# Patient Record
Sex: MALE | Race: ASIAN | NOT HISPANIC OR LATINO | Employment: STUDENT | URBAN - METROPOLITAN AREA
[De-identification: names, ages, dates, MRNs, and addresses within clinical notes are randomized per-mention and may not be internally consistent; named-entity substitution may affect disease eponyms.]

---

## 2019-01-30 ENCOUNTER — OFFICE VISIT (OUTPATIENT)
Dept: MEDICAL GROUP | Facility: PHYSICIAN GROUP | Age: 24
End: 2019-01-30
Payer: COMMERCIAL

## 2019-01-30 ENCOUNTER — HOSPITAL ENCOUNTER (OUTPATIENT)
Dept: LAB | Facility: MEDICAL CENTER | Age: 24
End: 2019-01-30
Attending: FAMILY MEDICINE
Payer: COMMERCIAL

## 2019-01-30 VITALS
HEART RATE: 80 BPM | HEIGHT: 63 IN | RESPIRATION RATE: 12 BRPM | OXYGEN SATURATION: 100 % | BODY MASS INDEX: 20.55 KG/M2 | WEIGHT: 116 LBS | DIASTOLIC BLOOD PRESSURE: 68 MMHG | TEMPERATURE: 98.4 F | SYSTOLIC BLOOD PRESSURE: 104 MMHG

## 2019-01-30 DIAGNOSIS — Z76.89 ESTABLISHING CARE WITH NEW DOCTOR, ENCOUNTER FOR: ICD-10-CM

## 2019-01-30 DIAGNOSIS — Z82.3 FAMILY HISTORY OF STROKE: ICD-10-CM

## 2019-01-30 DIAGNOSIS — Z82.49 FAMILY HISTORY OF HYPERTENSION IN MOTHER: ICD-10-CM

## 2019-01-30 LAB
ALBUMIN SERPL BCP-MCNC: 4.5 G/DL (ref 3.2–4.9)
ALBUMIN/GLOB SERPL: 1.8 G/DL
ALP SERPL-CCNC: 109 U/L (ref 30–99)
ALT SERPL-CCNC: 23 U/L (ref 2–50)
ANION GAP SERPL CALC-SCNC: 5 MMOL/L (ref 0–11.9)
AST SERPL-CCNC: 16 U/L (ref 12–45)
BASOPHILS # BLD AUTO: 1.1 % (ref 0–1.8)
BASOPHILS # BLD: 0.06 K/UL (ref 0–0.12)
BILIRUB SERPL-MCNC: 1.2 MG/DL (ref 0.1–1.5)
BUN SERPL-MCNC: 13 MG/DL (ref 8–22)
CALCIUM SERPL-MCNC: 10.3 MG/DL (ref 8.5–10.5)
CHLORIDE SERPL-SCNC: 105 MMOL/L (ref 96–112)
CHOLEST SERPL-MCNC: 141 MG/DL (ref 100–199)
CO2 SERPL-SCNC: 30 MMOL/L (ref 20–33)
CREAT SERPL-MCNC: 0.79 MG/DL (ref 0.5–1.4)
EOSINOPHIL # BLD AUTO: 0.1 K/UL (ref 0–0.51)
EOSINOPHIL NFR BLD: 1.9 % (ref 0–6.9)
ERYTHROCYTE [DISTWIDTH] IN BLOOD BY AUTOMATED COUNT: 37.6 FL (ref 35.9–50)
FASTING STATUS PATIENT QL REPORTED: NORMAL
GLOBULIN SER CALC-MCNC: 2.5 G/DL (ref 1.9–3.5)
GLUCOSE SERPL-MCNC: 84 MG/DL (ref 65–99)
HCT VFR BLD AUTO: 46.8 % (ref 42–52)
HDLC SERPL-MCNC: 45 MG/DL
HGB BLD-MCNC: 16.1 G/DL (ref 14–18)
IMM GRANULOCYTES # BLD AUTO: 0.01 K/UL (ref 0–0.11)
IMM GRANULOCYTES NFR BLD AUTO: 0.2 % (ref 0–0.9)
LDLC SERPL CALC-MCNC: 85 MG/DL
LYMPHOCYTES # BLD AUTO: 2 K/UL (ref 1–4.8)
LYMPHOCYTES NFR BLD: 37.9 % (ref 22–41)
MCH RBC QN AUTO: 29 PG (ref 27–33)
MCHC RBC AUTO-ENTMCNC: 34.4 G/DL (ref 33.7–35.3)
MCV RBC AUTO: 84.3 FL (ref 81.4–97.8)
MONOCYTES # BLD AUTO: 0.39 K/UL (ref 0–0.85)
MONOCYTES NFR BLD AUTO: 7.4 % (ref 0–13.4)
NEUTROPHILS # BLD AUTO: 2.72 K/UL (ref 1.82–7.42)
NEUTROPHILS NFR BLD: 51.5 % (ref 44–72)
NRBC # BLD AUTO: 0 K/UL
NRBC BLD-RTO: 0 /100 WBC
PLATELET # BLD AUTO: 248 K/UL (ref 164–446)
PMV BLD AUTO: 12 FL (ref 9–12.9)
POTASSIUM SERPL-SCNC: 4 MMOL/L (ref 3.6–5.5)
PROT SERPL-MCNC: 7 G/DL (ref 6–8.2)
RBC # BLD AUTO: 5.55 M/UL (ref 4.7–6.1)
SODIUM SERPL-SCNC: 140 MMOL/L (ref 135–145)
TRIGL SERPL-MCNC: 53 MG/DL (ref 0–149)
WBC # BLD AUTO: 5.3 K/UL (ref 4.8–10.8)

## 2019-01-30 PROCEDURE — 36415 COLL VENOUS BLD VENIPUNCTURE: CPT

## 2019-01-30 PROCEDURE — 80061 LIPID PANEL: CPT

## 2019-01-30 PROCEDURE — 85025 COMPLETE CBC W/AUTO DIFF WBC: CPT

## 2019-01-30 PROCEDURE — 80053 COMPREHEN METABOLIC PANEL: CPT

## 2019-01-30 PROCEDURE — 99203 OFFICE O/P NEW LOW 30 MIN: CPT | Performed by: FAMILY MEDICINE

## 2019-01-30 ASSESSMENT — PATIENT HEALTH QUESTIONNAIRE - PHQ9: CLINICAL INTERPRETATION OF PHQ2 SCORE: 0

## 2019-01-30 NOTE — PROGRESS NOTES
CC:  Diagnoses of Establishing care with new doctor, encounter for, Family history of stroke, and Family history of hypertension in mother were pertinent to this visit.    HISTORY OF THE PRESENT ILLNESS: Patient is a 23 y.o. male. This pleasant patient is here today to establish care.  It has been several years since he has been followed by primary care provider.  Recently moved here from [facility] in 2016.  Denies any surgeries or hospitalizations.    Health Maintenance: Received his flu vaccine in October 2018.  States he is up-to-date on his other immunizations.  We will ask him to bring records in at next visit or my chart us the immunization records.      Family history of stroke  This is a chronic medical problem.  He mentions that his 57-year-old mother suffered a stroke in May 2018.  Since then she has been in and out of the hospitals.  She is currently at [facility].  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    Family history of hypertension in mother  This is a chronic diagnosis for his mother.  She is currently on treatment for hypertension.  She suffered a stroke in May 2018 and is currently hospitalized at [facility].  His blood pressure is well controlled.  He denies any headaches or chest pain.    Allergies: Patient has no known allergies.    No current [facility]-ordered outpatient prescriptions on file.     No current [facility]-ordered facility-administered medications on file.        History reviewed. No pertinent past medical history.    History reviewed. No pertinent surgical history.    Social History   Substance Use Topics   • Smoking status: Never Smoker   • Smokeless tobacco: Never Used   • Alcohol use No       Social History     Social History Narrative   • No narrative on file       Family History   Problem Relation Age of Onset   • Hypertension Mother    • Stroke Mother    • No Known Problems Father    • No Known Problems Brother        ROS:     -  "Constitutional: Negative for fever, chills, and fatigue   - Eyes:  Negative blurry vision or eye pain    - ENT: Negative for ear pain, rhinorrhea, sinus congestion, sore throat    - Respiratory: Negative for cough or shortness of breath    - Cardiovascular: Negative for chest pain, palpitations    - Gastrointestinal: Negative for heartburn, nausea, vomiting, abdominal pain,     - Genitourinary: Negative for dysuria    - Musculoskeletal: Negative for myalgias    - Skin: Negative for rash, itching    - Neurological: Negative for headaches, dizziness    - Endo:  Negative for increased thirst or polyuria    - Heme/Lymph: Does not bruise/bleed easily.     - Psychiatric/Behavioral: Negative for depression and anxiety   .  Exam: Blood pressure 104/68, pulse 80, temperature 36.9 °C (98.4 °F), resp. rate 12, height 1.6 m (5' 3\"), weight 52.6 kg (116 lb), SpO2 100 %. Body mass index is 20.55 kg/m².    General:  Normal appearing. No distress.  Eyes:  Eyes conjunctiva clear lids without ptosis, pupils equal and reactive to light accommodation,   ENMT:   ears normal shape and contour, tympanic membranes are benign, nasal mucosa benign, oropharynx is without erythema, edema or exudates.   Neck:  Supple without JVD or bruit. Thyroid is not enlarged.  Pulmonary:  Clear to ausculation.  Normal effort. No rales, ronchi, or wheezing.  Cardiovascular:  Regular rate and rhythm   Abdomen:  Soft, nontender, nondistended. Normal bowel sounds.  Neurologic:  No tremors  Lymph:  No cervical, supraclavicular  lymph nodes are palpable  Skin:  Warm and dry.  No obvious lesions.  Musculoskeletal:  Normal gait. No extremity cyanosis, clubbing, or edema.  Psych:   Normal mood and affect. Alert and oriented x3. Judgment and insight is normal.    Please note that this dictation was created using voice recognition software. I have made every reasonable attempt to correct obvious errors, but I expect that there are errors of grammar and possibly " content that I did not discover before finalizing the note.      Assessment/Plan  1. Establishing care with new doctor, encounter for  Patient is establishing care today.      2. Family history of stroke  Family history of stroke in mom at the age of 56.  We will check baseline labs.  - Lipid Profile; Future  - CBC WITH DIFFERENTIAL; Future    3. Family history of hypertension in mother  Family history of hypertension in mother.  Check baseline labs.  - COMP METABOLIC PANEL; Future        Return in about 1 year (around 1/30/2020).

## 2019-01-30 NOTE — ASSESSMENT & PLAN NOTE
This is a chronic medical problem.  He mentions that his 57-year-old mother suffered a stroke in May 2018.  Since then she has been in and out of the hospitals.  She is currently at [facility].

## 2019-01-30 NOTE — ASSESSMENT & PLAN NOTE
This is a chronic diagnosis for his mother.  She is currently on treatment for hypertension.  She suffered a stroke in May 2018 and is currently hospitalized at [facility].

## 2023-04-08 ENCOUNTER — OFFICE VISIT (OUTPATIENT)
Dept: URGENT CARE | Facility: PHYSICIAN GROUP | Age: 28
End: 2023-04-08
Payer: COMMERCIAL

## 2023-04-08 VITALS
HEIGHT: 63 IN | BODY MASS INDEX: 22.15 KG/M2 | HEART RATE: 88 BPM | SYSTOLIC BLOOD PRESSURE: 120 MMHG | RESPIRATION RATE: 18 BRPM | WEIGHT: 125 LBS | OXYGEN SATURATION: 100 % | TEMPERATURE: 98 F | DIASTOLIC BLOOD PRESSURE: 72 MMHG

## 2023-04-08 DIAGNOSIS — S00.33XA CONTUSION OF NOSE, INITIAL ENCOUNTER: ICD-10-CM

## 2023-04-08 PROCEDURE — 99204 OFFICE O/P NEW MOD 45 MIN: CPT | Performed by: NURSE PRACTITIONER

## 2023-04-08 RX ORDER — IBUPROFEN 600 MG/1
600 TABLET ORAL EVERY 6 HOURS PRN
Qty: 30 TABLET | Refills: 0 | Status: SHIPPED | OUTPATIENT
Start: 2023-04-08

## 2023-04-08 ASSESSMENT — ENCOUNTER SYMPTOMS
DIZZINESS: 0
BLURRED VISION: 0
DOUBLE VISION: 0
HEADACHES: 0

## 2023-04-09 NOTE — PATIENT INSTRUCTIONS
-Ice for swelling.  -Apply pressure for nose bleed.  -Ibuprofen or Tylenol for pain and inflammation.  -Follow up with your primary care provider.     Follow up emergently for uncontrolled nose bleed, vision changes, severe headache, dizziness, persistent vomiting, difficulty moving eyes, or loss of consciousness. Follow up for persistent or worsening symptoms, or any other concerns.

## 2023-04-11 ASSESSMENT — ENCOUNTER SYMPTOMS
VISUAL CHANGE: 0
VOMITING: 0
VERTIGO: 0
LOSS OF CONSCIOUSNESS: 0